# Patient Record
Sex: FEMALE | Race: BLACK OR AFRICAN AMERICAN
[De-identification: names, ages, dates, MRNs, and addresses within clinical notes are randomized per-mention and may not be internally consistent; named-entity substitution may affect disease eponyms.]

---

## 2020-01-05 ENCOUNTER — HOSPITAL ENCOUNTER (EMERGENCY)
Dept: HOSPITAL 35 - ER | Age: 35
LOS: 1 days | Discharge: HOME | End: 2020-01-06
Payer: COMMERCIAL

## 2020-01-05 VITALS — HEIGHT: 67 IN | BODY MASS INDEX: 45.36 KG/M2 | WEIGHT: 289 LBS

## 2020-01-05 DIAGNOSIS — J20.9: ICD-10-CM

## 2020-01-05 DIAGNOSIS — J45.901: Primary | ICD-10-CM

## 2020-01-05 DIAGNOSIS — Z88.6: ICD-10-CM

## 2020-01-06 VITALS — SYSTOLIC BLOOD PRESSURE: 154 MMHG | DIASTOLIC BLOOD PRESSURE: 103 MMHG

## 2020-01-06 LAB
ALBUMIN SERPL-MCNC: 3.3 G/DL (ref 3.4–5)
ALT SERPL-CCNC: 15 U/L (ref 30–65)
ANION GAP SERPL CALC-SCNC: 8 MMOL/L (ref 7–16)
APTT BLD: 34.3 SECONDS (ref 24.5–32.8)
AST SERPL-CCNC: 9 U/L (ref 15–37)
BASOPHILS NFR BLD AUTO: 0.5 % (ref 0–2)
BILIRUB SERPL-MCNC: 0.2 MG/DL
BUN SERPL-MCNC: 7 MG/DL (ref 7–18)
CALCIUM SERPL-MCNC: 8.8 MG/DL (ref 8.5–10.1)
CHLORIDE SERPL-SCNC: 101 MMOL/L (ref 98–107)
CO2 SERPL-SCNC: 25 MMOL/L (ref 21–32)
CREAT SERPL-MCNC: 0.7 MG/DL (ref 0.6–1)
D DIMER PPP FEU-MCNC: < 0.19 UG/MLFEU (ref 0.19–0.5)
EOSINOPHIL NFR BLD: 0.4 % (ref 0–3)
ERYTHROCYTE [DISTWIDTH] IN BLOOD BY AUTOMATED COUNT: 15.7 % (ref 10.5–14.5)
GLUCOSE SERPL-MCNC: 119 MG/DL (ref 74–106)
GRANULOCYTES NFR BLD MANUAL: 66.3 % (ref 36–66)
HCT VFR BLD CALC: 37.8 % (ref 37–47)
HGB BLD-MCNC: 12 GM/DL (ref 12–15)
INR PPP: 1
LYMPHOCYTES NFR BLD AUTO: 24.6 % (ref 24–44)
MAGNESIUM SERPL-MCNC: 1.7 MG/DL (ref 1.8–2.4)
MCH RBC QN AUTO: 26 PG (ref 26–34)
MCHC RBC AUTO-ENTMCNC: 31.7 G/DL (ref 28–37)
MCV RBC: 82 FL (ref 80–100)
MONOCYTES NFR BLD: 8.2 % (ref 1–8)
NEUTROPHILS # BLD: 9.9 THOU/UL (ref 1.4–8.2)
PLATELET # BLD: 451 THOU/UL (ref 150–400)
POTASSIUM SERPL-SCNC: 3.7 MMOL/L (ref 3.5–5.1)
PROT SERPL-MCNC: 8.1 G/DL (ref 6.4–8.2)
PROTHROMBIN TIME: 10.5 SECONDS (ref 9.3–11.4)
RBC # BLD AUTO: 4.61 MIL/UL (ref 4.2–5)
SODIUM SERPL-SCNC: 134 MMOL/L (ref 136–145)
TROPONIN I SERPL-MCNC: <0.06 NG/ML (ref ?–0.06)
WBC # BLD AUTO: 15 THOU/UL (ref 4–11)

## 2020-01-06 NOTE — EKG
[place]
[place]  [address]
Phone:  [phone]                    ELECTROCARDIOGRAM REPORT      
_______________________________________________________________________________
 
Name:       ROJAS GRAF    [facility]                     [id number]     Account #:      [id number]  
Admission:  20    Attend Phys:                          
Discharge:  20    Date of Birth:  [birth year]  
                                                          Report #: [id number]
   [id number]
_______________________________________________________________________________
THIS REPORT FOR:   //name//                          
 
                         [facility] ED
                                       
Test Date:    2020               Test Time:    23:45:46
Pat Name:     ROJAS RGAF        Department:   
Patient ID:   [id number]            Room:          
Gender:       F                        Technician:   JOSE
:          1985               Requested By: Boo Almaraz
Order Number: [id number] MD:   Krzysztof Geronimo
                                 Measurements
Intervals                              Axis          
Rate:         84                       P:            2
AK:           159                      QRS:          5
QRSD:         80                       T:            66
QT:           349                                    
QTc:          413                                    
                           Interpretive Statements
Sinus rhythm
LVH by voltage
Compared to ECG 2017 19:37:34
Left ventricular hypertrophy now present
 
Electronically Signed On 2020 10:20:37 CST by Krzysztof Geronimo
[email or web address]
 
 
 
 
 
 
 
 
 
 
 
 
 
 
 
 
 
 
  <ELECTRONICALLY SIGNED>
   By: Krzysztof Geronimo MD, [facility]   
  20     1020
D: [id number]                           _____________________________________
T: 20                           Krzysztof Geronimo MD, FACC     /EPI